# Patient Record
Sex: FEMALE | Race: WHITE | Employment: FULL TIME | URBAN - METROPOLITAN AREA
[De-identification: names, ages, dates, MRNs, and addresses within clinical notes are randomized per-mention and may not be internally consistent; named-entity substitution may affect disease eponyms.]

---

## 2017-02-10 ENCOUNTER — HOSPITAL ENCOUNTER (OUTPATIENT)
Age: 32
Discharge: HOME | End: 2017-02-10
Attending: FAMILY MEDICINE
Payer: COMMERCIAL

## 2017-02-10 VITALS
OXYGEN SATURATION: 100 % | BODY MASS INDEX: 37 KG/M2 | TEMPERATURE: 98 F | RESPIRATION RATE: 18 BRPM | HEART RATE: 81 BPM | WEIGHT: 207.25 LBS | SYSTOLIC BLOOD PRESSURE: 134 MMHG | DIASTOLIC BLOOD PRESSURE: 87 MMHG

## 2017-02-10 DIAGNOSIS — S23.9XXA THORACIC BACK SPRAIN, INITIAL ENCOUNTER: ICD-10-CM

## 2017-02-10 DIAGNOSIS — S46.819A TRAPEZIUS STRAIN, UNSPECIFIED LATERALITY, INITIAL ENCOUNTER: ICD-10-CM
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
## 2017-02-10 LAB — POCT URINE PREGNANCY: NEGATIVE

## 2017-02-10 PROCEDURE — 99214 OFFICE O/P EST MOD 30 MIN: CPT

## 2017-02-10 PROCEDURE — 99213 OFFICE O/P EST LOW 20 MIN: CPT

## 2017-02-10 PROCEDURE — 81025 URINE PREGNANCY TEST: CPT | Performed by: FAMILY MEDICINE

## 2017-02-10 RX ORDER — CYCLOBENZAPRINE HCL 10 MG
10 TABLET ORAL 3 TIMES DAILY
Qty: 9 TABLET | Refills: 0 | Status: SHIPPED | OUTPATIENT
Start: 2017-02-10 | End: 2017-02-13
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
## 2017-08-24 ENCOUNTER — NURSE TRIAGE (OUTPATIENT)
Dept: FAMILY MEDICINE CLINIC | Facility: CLINIC | Age: 32
End: 2017-08-24

## 2017-09-20 ENCOUNTER — TELEPHONE (OUTPATIENT)
Dept: FAMILY MEDICINE CLINIC | Facility: CLINIC | Age: 32
End: 2017-09-20

## 2017-09-20 NOTE — TELEPHONE ENCOUNTER
----- Message from Angie Echols sent at 9/20/2017 12:10 PM CDT -----      ----- Message -----  From: Gelacio Brasher  Sent: 9/12/2017   3:56 PM  To: Zain Echeverria MD    She did not, I will do so.     Thank you  ----- Message -----  From: Aaron Jett MD

## 2017-09-29 PROCEDURE — 87624 HPV HI-RISK TYP POOLED RSLT: CPT

## 2017-09-29 PROCEDURE — 88175 CYTOPATH C/V AUTO FLUID REDO: CPT

## 2017-09-30 ENCOUNTER — LAB ENCOUNTER (OUTPATIENT)
Dept: LAB | Age: 32
End: 2017-09-30
Attending: NURSE PRACTITIONER
Payer: COMMERCIAL

## 2017-09-30 DIAGNOSIS — Z01.419 PAP SMEAR, LOW-RISK: Primary | ICD-10-CM

## 2017-09-30 DIAGNOSIS — Z00.00 ROUTINE GENERAL MEDICAL EXAMINATION AT A HEALTH CARE FACILITY: ICD-10-CM

## 2018-07-13 ENCOUNTER — OFFICE VISIT (OUTPATIENT)
Dept: FAMILY MEDICINE CLINIC | Facility: CLINIC | Age: 33
End: 2018-07-13

## 2018-07-13 VITALS
BODY MASS INDEX: 30.3 KG/M2 | HEIGHT: 63 IN | SYSTOLIC BLOOD PRESSURE: 122 MMHG | DIASTOLIC BLOOD PRESSURE: 80 MMHG | HEART RATE: 72 BPM | OXYGEN SATURATION: 98 % | WEIGHT: 171 LBS | TEMPERATURE: 99 F | RESPIRATION RATE: 16 BRPM

## 2018-07-13 DIAGNOSIS — G89.29 CHRONIC LEFT-SIDED LOW BACK PAIN WITH LEFT-SIDED SCIATICA: ICD-10-CM

## 2018-07-13 DIAGNOSIS — F41.0 GENERALIZED ANXIETY DISORDER WITH PANIC ATTACKS: ICD-10-CM

## 2018-07-13 DIAGNOSIS — M50.90 CERVICAL DISC DISEASE: ICD-10-CM

## 2018-07-13 DIAGNOSIS — M54.2 CERVICALGIA: ICD-10-CM

## 2018-07-13 DIAGNOSIS — G56.22 ULNAR NERVE ENTRAPMENT AT ELBOW, LEFT: Primary | ICD-10-CM

## 2018-07-13 DIAGNOSIS — F41.1 GENERALIZED ANXIETY DISORDER WITH PANIC ATTACKS: ICD-10-CM

## 2018-07-13 DIAGNOSIS — M54.42 CHRONIC LEFT-SIDED LOW BACK PAIN WITH LEFT-SIDED SCIATICA: ICD-10-CM

## 2018-07-13 PROCEDURE — 99214 OFFICE O/P EST MOD 30 MIN: CPT | Performed by: FAMILY MEDICINE

## 2018-07-13 RX ORDER — CYCLOBENZAPRINE HCL 5 MG
5 TABLET ORAL NIGHTLY
Qty: 10 TABLET | Refills: 0 | Status: SHIPPED | OUTPATIENT
Start: 2018-07-13

## 2018-07-13 RX ORDER — PREDNISONE 20 MG/1
40 TABLET ORAL DAILY
Qty: 10 TABLET | Refills: 0 | Status: SHIPPED | OUTPATIENT
Start: 2018-07-13 | End: 2018-07-18

## 2018-07-13 NOTE — PATIENT INSTRUCTIONS
[address] [address]  [address] [facility], [facility], [address]  [phone]    Bob Cons

## 2018-07-15 NOTE — PROGRESS NOTES
Ty Mayen is a 29year old female. Patient presents with:  Back Pain: lower back pain,numbness in both arms, hands and feet      HPI:   1. Low back pain  3-4 days  Lower back, left side, radiates down left leg  No injuries, falls.   sharp stabbing 72   Temp 98.9 °F (37.2 °C) (Oral)   Resp 16   Ht 63\"   Wt 171 lb   SpO2 98%   BMI 30.29 kg/m²  Body mass index is 30.29 kg/m².       GENERAL: well developed, well nourished,in no apparent distress  SKIN: no rashes,no suspicious lesions  HEENT: atraumatic, any questions, complications, allergies, or worsening or changing symptoms. Patient is to call with any side effects or complications from the treatments as a result of today.

## 2018-12-11 PROBLEM — Z30.430 ENCOUNTER FOR IUD INSERTION: Status: ACTIVE | Noted: 2018-12-11

## 2022-06-03 PROCEDURE — 88175 CYTOPATH C/V AUTO FLUID REDO: CPT | Performed by: NURSE PRACTITIONER

## 2022-06-27 ENCOUNTER — HOSPITAL ENCOUNTER (OUTPATIENT)
Dept: ULTRASOUND IMAGING | Age: 37
Discharge: HOME | End: 2022-06-27
Attending: NURSE PRACTITIONER
Payer: COMMERCIAL

## 2022-06-27 ENCOUNTER — LAB ENCOUNTER (OUTPATIENT)
Dept: LAB | Age: 37
End: 2022-06-27
Attending: NURSE PRACTITIONER
Payer: COMMERCIAL

## 2022-06-27 ENCOUNTER — HOSPITAL ENCOUNTER (OUTPATIENT)
Dept: MAMMOGRAPHY | Age: 37
Discharge: HOME | End: 2022-06-27
Attending: NURSE PRACTITIONER
Payer: COMMERCIAL

## 2022-06-27 DIAGNOSIS — Z01.419 WOMEN'S ANNUAL ROUTINE GYNECOLOGICAL EXAMINATION: ICD-10-CM

## 2022-06-27 DIAGNOSIS — R92.8 FOLLOW-UP EXAMINATION OF ABNORMAL MAMMOGRAM: ICD-10-CM

## 2022-06-27 DIAGNOSIS — Z80.3 FAMILY HISTORY OF BREAST CANCER IN FIRST DEGREE RELATIVE: ICD-10-CM

## 2022-06-27 LAB
ALBUMIN SERPL-MCNC: 4 G/DL (ref 3.4–5)
ALBUMIN/GLOB SERPL: 1.3 {RATIO} (ref 1–2)
ALP LIVER SERPL-CCNC: 42 U/L
ALT SERPL-CCNC: 14 U/L
ANION GAP SERPL CALC-SCNC: 5 MMOL/L (ref 0–18)
AST SERPL-CCNC: 12 U/L (ref 15–37)
BASOPHILS # BLD AUTO: 0.02 X10(3) UL (ref 0–0.2)
BASOPHILS NFR BLD AUTO: 0.4 %
BILIRUB SERPL-MCNC: 0.3 MG/DL (ref 0.1–2)
BUN BLD-MCNC: 12 MG/DL (ref 7–18)
CALCIUM BLD-MCNC: 9 MG/DL (ref 8.5–10.1)
CHLORIDE SERPL-SCNC: 108 MMOL/L (ref 98–112)
CHOLEST SERPL-MCNC: 127 MG/DL (ref ?–200)
CO2 SERPL-SCNC: 25 MMOL/L (ref 21–32)
CREAT BLD-MCNC: 0.66 MG/DL
EOSINOPHIL # BLD AUTO: 0.07 X10(3) UL (ref 0–0.7)
EOSINOPHIL NFR BLD AUTO: 1.3 %
ERYTHROCYTE [DISTWIDTH] IN BLOOD BY AUTOMATED COUNT: 12 %
FASTING PATIENT LIPID ANSWER: YES
FASTING STATUS PATIENT QL REPORTED: YES
GLOBULIN PLAS-MCNC: 3.1 G/DL (ref 2.8–4.4)
GLUCOSE BLD-MCNC: 94 MG/DL (ref 70–99)
HCT VFR BLD AUTO: 40.5 %
HDLC SERPL-MCNC: 42 MG/DL (ref 40–59)
HGB BLD-MCNC: 13.2 G/DL
IMM GRANULOCYTES # BLD AUTO: 0.02 X10(3) UL (ref 0–1)
IMM GRANULOCYTES NFR BLD: 0.4 %
LDLC SERPL CALC-MCNC: 75 MG/DL (ref ?–100)
LYMPHOCYTES # BLD AUTO: 1.57 X10(3) UL (ref 1–4)
LYMPHOCYTES NFR BLD AUTO: 28.8 %
MCH RBC QN AUTO: 31.8 PG (ref 26–34)
MCHC RBC AUTO-ENTMCNC: 32.6 G/DL (ref 31–37)
MCV RBC AUTO: 97.6 FL
MONOCYTES # BLD AUTO: 0.55 X10(3) UL (ref 0.1–1)
MONOCYTES NFR BLD AUTO: 10.1 %
NEUTROPHILS # BLD AUTO: 3.23 X10 (3) UL (ref 1.5–7.7)
NEUTROPHILS # BLD AUTO: 3.23 X10(3) UL (ref 1.5–7.7)
NEUTROPHILS NFR BLD AUTO: 59 %
NONHDLC SERPL-MCNC: 85 MG/DL (ref ?–130)
OSMOLALITY SERPL CALC.SUM OF ELEC: 286 MOSM/KG (ref 275–295)
PLATELET # BLD AUTO: 261 10(3)UL (ref 150–450)
POTASSIUM SERPL-SCNC: 4.5 MMOL/L (ref 3.5–5.1)
PROT SERPL-MCNC: 7.1 G/DL (ref 6.4–8.2)
RBC # BLD AUTO: 4.15 X10(6)UL
SODIUM SERPL-SCNC: 138 MMOL/L (ref 136–145)
TRIGL SERPL-MCNC: 42 MG/DL (ref 30–149)
TSI SER-ACNC: 1.13 MIU/ML (ref 0.36–3.74)
VIT D+METAB SERPL-MCNC: 26 NG/ML (ref 30–100)
VLDLC SERPL CALC-MCNC: 6 MG/DL (ref 0–30)
WBC # BLD AUTO: 5.5 X10(3) UL (ref 4–11)

## 2022-06-27 PROCEDURE — 82306 VITAMIN D 25 HYDROXY: CPT

## 2022-06-27 PROCEDURE — 80061 LIPID PANEL: CPT

## 2022-06-27 PROCEDURE — 77062 BREAST TOMOSYNTHESIS BI: CPT | Performed by: NURSE PRACTITIONER

## 2022-06-27 PROCEDURE — 36415 COLL VENOUS BLD VENIPUNCTURE: CPT

## 2022-06-27 PROCEDURE — 76642 ULTRASOUND BREAST LIMITED: CPT | Performed by: NURSE PRACTITIONER

## 2022-06-27 PROCEDURE — 84443 ASSAY THYROID STIM HORMONE: CPT

## 2022-06-27 PROCEDURE — 85025 COMPLETE CBC W/AUTO DIFF WBC: CPT

## 2022-06-27 PROCEDURE — 77066 DX MAMMO INCL CAD BI: CPT | Performed by: NURSE PRACTITIONER

## 2022-06-27 PROCEDURE — 80053 COMPREHEN METABOLIC PANEL: CPT

## 2022-10-19 PROBLEM — Z98.890 STATUS POST HYSTEROSCOPY: Status: ACTIVE | Noted: 2022-10-19

## 2023-01-19 ENCOUNTER — OFFICE VISIT (OUTPATIENT)
Dept: FAMILY MEDICINE CLINIC | Facility: CLINIC | Age: 38
End: 2023-01-19
Payer: COMMERCIAL

## 2023-01-19 VITALS
RESPIRATION RATE: 16 BRPM | DIASTOLIC BLOOD PRESSURE: 70 MMHG | WEIGHT: 177 LBS | SYSTOLIC BLOOD PRESSURE: 114 MMHG | BODY MASS INDEX: 31.36 KG/M2 | HEART RATE: 75 BPM | HEIGHT: 63 IN | OXYGEN SATURATION: 100 % | TEMPERATURE: 98 F

## 2023-01-19 DIAGNOSIS — Z28.21 INFLUENZA VACCINATION DECLINED BY PATIENT: ICD-10-CM

## 2023-01-19 DIAGNOSIS — R59.0 POSTERIOR AURICULAR LYMPHADENOPATHY: Primary | ICD-10-CM

## 2023-01-19 PROCEDURE — 99203 OFFICE O/P NEW LOW 30 MIN: CPT | Performed by: FAMILY MEDICINE

## 2023-01-19 PROCEDURE — 3078F DIAST BP <80 MM HG: CPT | Performed by: FAMILY MEDICINE

## 2023-01-19 PROCEDURE — 3074F SYST BP LT 130 MM HG: CPT | Performed by: FAMILY MEDICINE

## 2023-01-19 PROCEDURE — 3008F BODY MASS INDEX DOCD: CPT | Performed by: FAMILY MEDICINE

## 2023-08-16 ENCOUNTER — OFFICE VISIT (OUTPATIENT)
Dept: FAMILY MEDICINE CLINIC | Facility: CLINIC | Age: 38
End: 2023-08-16
Payer: COMMERCIAL

## 2023-08-16 VITALS
BODY MASS INDEX: 28 KG/M2 | RESPIRATION RATE: 16 BRPM | OXYGEN SATURATION: 99 % | HEIGHT: 63 IN | DIASTOLIC BLOOD PRESSURE: 74 MMHG | SYSTOLIC BLOOD PRESSURE: 122 MMHG | WEIGHT: 158 LBS | HEART RATE: 80 BPM | TEMPERATURE: 99 F

## 2023-08-16 DIAGNOSIS — M54.2 NECK PAIN: Primary | ICD-10-CM

## 2023-08-16 DIAGNOSIS — M54.6 ACUTE RIGHT-SIDED THORACIC BACK PAIN: ICD-10-CM

## 2023-08-16 DIAGNOSIS — M54.50 LUMBAR BACK PAIN: ICD-10-CM

## 2023-08-16 DIAGNOSIS — R20.2 PARESTHESIA OF LEFT UPPER EXTREMITY: ICD-10-CM

## 2023-08-16 DIAGNOSIS — R09.89 THROAT CLEARING: ICD-10-CM

## 2023-08-16 DIAGNOSIS — Z00.00 LABORATORY EXAM ORDERED AS PART OF ROUTINE GENERAL MEDICAL EXAMINATION: ICD-10-CM

## 2023-08-16 DIAGNOSIS — E55.9 VITAMIN D INSUFFICIENCY: ICD-10-CM

## 2023-08-16 DIAGNOSIS — Z01.30 BLOOD PRESSURE CHECK: ICD-10-CM

## 2023-08-16 PROBLEM — Z30.430 ENCOUNTER FOR IUD INSERTION: Status: RESOLVED | Noted: 2018-12-11 | Resolved: 2023-08-16

## 2023-08-16 PROBLEM — Z98.890 STATUS POST HYSTEROSCOPY: Status: RESOLVED | Noted: 2022-10-19 | Resolved: 2023-08-16

## 2023-08-16 PROCEDURE — 3074F SYST BP LT 130 MM HG: CPT | Performed by: FAMILY MEDICINE

## 2023-08-16 PROCEDURE — 3078F DIAST BP <80 MM HG: CPT | Performed by: FAMILY MEDICINE

## 2023-08-16 PROCEDURE — 3008F BODY MASS INDEX DOCD: CPT | Performed by: FAMILY MEDICINE

## 2023-08-16 PROCEDURE — 99214 OFFICE O/P EST MOD 30 MIN: CPT | Performed by: FAMILY MEDICINE

## 2023-08-16 RX ORDER — CYCLOBENZAPRINE HCL 5 MG
5 TABLET ORAL NIGHTLY PRN
Qty: 30 TABLET | Refills: 1 | Status: SHIPPED | OUTPATIENT
Start: 2023-08-16

## 2023-08-16 RX ORDER — MELOXICAM 15 MG/1
15 TABLET ORAL DAILY PRN
Qty: 30 TABLET | Refills: 1 | Status: SHIPPED | OUTPATIENT
Start: 2023-08-16

## 2023-08-19 ENCOUNTER — LAB ENCOUNTER (OUTPATIENT)
Dept: LAB | Age: 38
End: 2023-08-19
Attending: FAMILY MEDICINE
Payer: COMMERCIAL

## 2023-08-19 DIAGNOSIS — M54.50 LUMBAR BACK PAIN: ICD-10-CM

## 2023-08-19 DIAGNOSIS — Z00.00 LABORATORY EXAM ORDERED AS PART OF ROUTINE GENERAL MEDICAL EXAMINATION: ICD-10-CM

## 2023-08-19 DIAGNOSIS — Z00.00 ROUTINE GENERAL MEDICAL EXAMINATION AT A HEALTH CARE FACILITY: Primary | ICD-10-CM

## 2023-08-19 DIAGNOSIS — M54.2 NECK PAIN: ICD-10-CM

## 2023-08-19 DIAGNOSIS — E55.9 VITAMIN D INSUFFICIENCY: ICD-10-CM

## 2023-08-19 DIAGNOSIS — M54.6 ACUTE RIGHT-SIDED THORACIC BACK PAIN: ICD-10-CM

## 2023-08-19 LAB
ALBUMIN SERPL-MCNC: 3.9 G/DL (ref 3.4–5)
ALBUMIN/GLOB SERPL: 1.2 {RATIO} (ref 1–2)
ALP LIVER SERPL-CCNC: 30 U/L
ALT SERPL-CCNC: 22 U/L
ANION GAP SERPL CALC-SCNC: 4 MMOL/L (ref 0–18)
AST SERPL-CCNC: 15 U/L (ref 15–37)
BASOPHILS # BLD AUTO: 0.02 X10(3) UL (ref 0–0.2)
BASOPHILS NFR BLD AUTO: 0.4 %
BILIRUB SERPL-MCNC: 0.4 MG/DL (ref 0.1–2)
BUN BLD-MCNC: 14 MG/DL (ref 7–18)
CALCIUM BLD-MCNC: 9.1 MG/DL (ref 8.5–10.1)
CHLORIDE SERPL-SCNC: 108 MMOL/L (ref 98–112)
CHOLEST SERPL-MCNC: 140 MG/DL (ref ?–200)
CO2 SERPL-SCNC: 25 MMOL/L (ref 21–32)
CREAT BLD-MCNC: 0.7 MG/DL
CRP SERPL-MCNC: <0.29 MG/DL (ref ?–0.3)
EGFRCR SERPLBLD CKD-EPI 2021: 114 ML/MIN/1.73M2 (ref 60–?)
EOSINOPHIL # BLD AUTO: 0.05 X10(3) UL (ref 0–0.7)
EOSINOPHIL NFR BLD AUTO: 0.9 %
ERYTHROCYTE [DISTWIDTH] IN BLOOD BY AUTOMATED COUNT: 12.3 %
ERYTHROCYTE [SEDIMENTATION RATE] IN BLOOD: 7 MM/HR
FASTING PATIENT LIPID ANSWER: YES
FASTING STATUS PATIENT QL REPORTED: YES
GLOBULIN PLAS-MCNC: 3.2 G/DL (ref 2.8–4.4)
GLUCOSE BLD-MCNC: 92 MG/DL (ref 70–99)
HCT VFR BLD AUTO: 39.7 %
HDLC SERPL-MCNC: 51 MG/DL (ref 40–59)
HGB BLD-MCNC: 13.2 G/DL
IMM GRANULOCYTES # BLD AUTO: 0.02 X10(3) UL (ref 0–1)
IMM GRANULOCYTES NFR BLD: 0.4 %
LDLC SERPL CALC-MCNC: 76 MG/DL (ref ?–100)
LYMPHOCYTES # BLD AUTO: 1.58 X10(3) UL (ref 1–4)
LYMPHOCYTES NFR BLD AUTO: 28.3 %
MCH RBC QN AUTO: 31.5 PG (ref 26–34)
MCHC RBC AUTO-ENTMCNC: 33.2 G/DL (ref 31–37)
MCV RBC AUTO: 94.7 FL
MONOCYTES # BLD AUTO: 0.55 X10(3) UL (ref 0.1–1)
MONOCYTES NFR BLD AUTO: 9.9 %
NEUTROPHILS # BLD AUTO: 3.36 X10 (3) UL (ref 1.5–7.7)
NEUTROPHILS # BLD AUTO: 3.36 X10(3) UL (ref 1.5–7.7)
NEUTROPHILS NFR BLD AUTO: 60.1 %
NONHDLC SERPL-MCNC: 89 MG/DL (ref ?–130)
OSMOLALITY SERPL CALC.SUM OF ELEC: 284 MOSM/KG (ref 275–295)
PLATELET # BLD AUTO: 233 10(3)UL (ref 150–450)
POTASSIUM SERPL-SCNC: 4.7 MMOL/L (ref 3.5–5.1)
PROT SERPL-MCNC: 7.1 G/DL (ref 6.4–8.2)
RBC # BLD AUTO: 4.19 X10(6)UL
RHEUMATOID FACT SERPL-ACNC: <10 IU/ML (ref ?–15)
SODIUM SERPL-SCNC: 137 MMOL/L (ref 136–145)
TRIGL SERPL-MCNC: 60 MG/DL (ref 30–149)
URATE SERPL-MCNC: 4.2 MG/DL
VIT D+METAB SERPL-MCNC: 36.8 NG/ML (ref 30–100)
VLDLC SERPL CALC-MCNC: 9 MG/DL (ref 0–30)
WBC # BLD AUTO: 5.6 X10(3) UL (ref 4–11)

## 2023-08-19 PROCEDURE — 80053 COMPREHEN METABOLIC PANEL: CPT | Performed by: FAMILY MEDICINE

## 2023-08-19 PROCEDURE — 86140 C-REACTIVE PROTEIN: CPT | Performed by: FAMILY MEDICINE

## 2023-08-19 PROCEDURE — 86225 DNA ANTIBODY NATIVE: CPT | Performed by: FAMILY MEDICINE

## 2023-08-19 PROCEDURE — 85652 RBC SED RATE AUTOMATED: CPT | Performed by: FAMILY MEDICINE

## 2023-08-19 PROCEDURE — 86431 RHEUMATOID FACTOR QUANT: CPT | Performed by: FAMILY MEDICINE

## 2023-08-19 PROCEDURE — 81374 HLA I TYPING 1 ANTIGEN LR: CPT | Performed by: FAMILY MEDICINE

## 2023-08-19 PROCEDURE — 86200 CCP ANTIBODY: CPT | Performed by: FAMILY MEDICINE

## 2023-08-19 PROCEDURE — 82306 VITAMIN D 25 HYDROXY: CPT | Performed by: FAMILY MEDICINE

## 2023-08-19 PROCEDURE — 84550 ASSAY OF BLOOD/URIC ACID: CPT | Performed by: FAMILY MEDICINE

## 2023-08-19 PROCEDURE — 80061 LIPID PANEL: CPT | Performed by: FAMILY MEDICINE

## 2023-08-19 PROCEDURE — 85025 COMPLETE CBC W/AUTO DIFF WBC: CPT | Performed by: FAMILY MEDICINE

## 2023-08-19 PROCEDURE — 86038 ANTINUCLEAR ANTIBODIES: CPT | Performed by: FAMILY MEDICINE

## 2023-08-21 LAB
CCP IGG SERPL-ACNC: 1.2 U/ML (ref 0–6.9)
DSDNA IGG SERPL IA-ACNC: 3.3 IU/ML
ENA AB SER QL IA: 0.3 UG/L
ENA AB SER QL IA: NEGATIVE

## 2023-08-22 ENCOUNTER — HOSPITAL ENCOUNTER (OUTPATIENT)
Dept: MAMMOGRAPHY | Age: 38
Discharge: HOME | End: 2023-08-22
Payer: COMMERCIAL

## 2023-08-22 ENCOUNTER — HOSPITAL ENCOUNTER (OUTPATIENT)
Dept: ULTRASOUND IMAGING | Age: 38
Discharge: HOME | End: 2023-08-22
Payer: COMMERCIAL

## 2023-08-22 DIAGNOSIS — R92.2 INCONCLUSIVE MAMMOGRAM: ICD-10-CM

## 2023-08-22 PROCEDURE — 77066 DX MAMMO INCL CAD BI: CPT

## 2023-08-22 PROCEDURE — 77062 BREAST TOMOSYNTHESIS BI: CPT

## 2023-08-22 PROCEDURE — 76642 ULTRASOUND BREAST LIMITED: CPT

## 2023-08-24 ENCOUNTER — TELEPHONE (OUTPATIENT)
Dept: PHYSICAL THERAPY | Facility: HOSPITAL | Age: 38
End: 2023-08-24

## 2023-08-24 NOTE — PROGRESS NOTES
Reviewed mammogram results with patient  Patient reports chronic bilateral breast pain since 2021, especially with physical manipulation, reports a family hx of breast cancer in her mother at the age of 48. Denies lumps, skin changes, skin rashes, nipple discharge.   Patient advised to follow up with Dr. Esperanza Hamilton for further evaluation of her bilateral breast pain and for further recommendation

## 2023-08-28 ENCOUNTER — TELEPHONE (OUTPATIENT)
Dept: PHYSICAL THERAPY | Facility: HOSPITAL | Age: 38
End: 2023-08-28

## 2023-08-28 LAB — HLA-B27: NEGATIVE

## 2023-08-30 ENCOUNTER — OFFICE VISIT (OUTPATIENT)
Dept: PHYSICAL THERAPY | Age: 38
End: 2023-08-30
Attending: FAMILY MEDICINE
Payer: COMMERCIAL

## 2023-08-30 DIAGNOSIS — M54.50 LUMBAR BACK PAIN: Primary | ICD-10-CM

## 2023-08-30 DIAGNOSIS — M54.6 ACUTE RIGHT-SIDED THORACIC BACK PAIN: ICD-10-CM

## 2023-08-30 DIAGNOSIS — R20.2 PARESTHESIA OF LEFT UPPER EXTREMITY: ICD-10-CM

## 2023-08-30 DIAGNOSIS — M54.2 NECK PAIN: ICD-10-CM

## 2023-08-30 PROCEDURE — 97110 THERAPEUTIC EXERCISES: CPT

## 2023-08-30 PROCEDURE — 97161 PT EVAL LOW COMPLEX 20 MIN: CPT

## 2023-09-01 ENCOUNTER — TELEPHONE (OUTPATIENT)
Dept: PHYSICAL THERAPY | Facility: HOSPITAL | Age: 38
End: 2023-09-01

## 2023-09-12 ENCOUNTER — OFFICE VISIT (OUTPATIENT)
Dept: PHYSICAL THERAPY | Age: 38
End: 2023-09-12
Attending: FAMILY MEDICINE
Payer: COMMERCIAL

## 2023-09-12 PROCEDURE — 97140 MANUAL THERAPY 1/> REGIONS: CPT

## 2023-09-12 PROCEDURE — 97110 THERAPEUTIC EXERCISES: CPT

## 2023-09-18 ENCOUNTER — APPOINTMENT (OUTPATIENT)
Dept: PHYSICAL THERAPY | Age: 38
End: 2023-09-18
Attending: FAMILY MEDICINE
Payer: COMMERCIAL

## 2023-09-18 ENCOUNTER — TELEPHONE (OUTPATIENT)
Dept: PHYSICAL THERAPY | Facility: HOSPITAL | Age: 38
End: 2023-09-18

## 2023-09-18 ENCOUNTER — OFFICE VISIT (OUTPATIENT)
Dept: PHYSICAL THERAPY | Age: 38
End: 2023-09-18
Attending: FAMILY MEDICINE
Payer: COMMERCIAL

## 2023-09-18 PROCEDURE — 97110 THERAPEUTIC EXERCISES: CPT

## 2023-09-18 PROCEDURE — 97140 MANUAL THERAPY 1/> REGIONS: CPT

## 2023-09-20 ENCOUNTER — APPOINTMENT (OUTPATIENT)
Dept: PHYSICAL THERAPY | Age: 38
End: 2023-09-20
Attending: FAMILY MEDICINE
Payer: COMMERCIAL

## 2023-09-26 ENCOUNTER — OFFICE VISIT (OUTPATIENT)
Dept: PHYSICAL THERAPY | Age: 38
End: 2023-09-26
Attending: FAMILY MEDICINE
Payer: COMMERCIAL

## 2023-09-26 PROCEDURE — 97140 MANUAL THERAPY 1/> REGIONS: CPT

## 2023-09-26 PROCEDURE — 97110 THERAPEUTIC EXERCISES: CPT

## 2023-09-26 NOTE — PROGRESS NOTES
Dx: Left sided cervical radiculopathy         Authorized # of Visits:  61         Next MD visit: none scheduled  Fall Risk: standard         Precautions: n/a         Subjective: States that her neck has been feeling better as of late. Indicates that she continues to have paraesthesia into the left forearm and hand. Objective: Treatment progressed per treatment log. Date:9/12/2023 TX#: 2/12 Date:9/18/2023  TX#: 3/   Date:9/26/2023  TX#: 4/ Date:               TX#: 5/ Date:               TX#: 6/ Date:               TX#: 7/ Date:               TX#: 8/   VAS 2/10 VAS 4/10 VAS 4/10 VAS /10 VAS /10 VAS /10 VAS /10   TherEx (25 Min) TherEx (25 Min) TherEx (25 Min)       UBE 6 min 3/3 UBE 6 min 3/3 UBE 6 min 3/3       Scapular \"w\" red x 20 Supine Rows and diagonals green x 20 Supine Rows and diagonals green x 20       Supine Rows and diagonals green x 20 Ulnar nerve glide at wrist and elbow x2 (HEP) Biceps Stretch 10 sec x 5       Single arm rows 36# from high  Wrist flexor stretch 10 sec x 5         Doorway stretch 10 sec x 5         TRX Lat stretch 10 sec x 5                Manual PT (15 Min) Manual PT (15 Min) Manual PT (15 Min)       OA posterior glide; facet up/down glide C2C3 to C4C5 OA posterior glide; facet up/down glide C2C3 to C4C5 OA posterior glide; facet up/down glide C2C3 to C4C5        Ulnar Nerve glide at wrist and elbow x 10 each Ulnar Nerve glide at wrist and elbow x 10 each         DTM to the medial epicondyle and tlexor tendon of the left UE. Assessment: The patient responded well to today's intervention. Increased UE mobility compared to last session. Goals (12 visits):    1. Increase NDI assessment > 11% from INE to DC. 2. Patient will be aware of postural limitations and be able to correct them independently. 3. Patient will have an increase in spinal mobility to >75% for all planes of motion in order to return to more normalized cervical ROM and function.     4. Patient will have an increase in core strength to assist with returning to sustained posturing and function as required for ADLs. 5. Patient will demonstrate an increase in MLT of the anterior thorax in order to return to more improved posture with ADLs. Plan: Assess response to today's treatment and progress as tolerated. Charges: TherEx 2 (25 min);  Manual PT 1 (15 min)      Total Timed Treatment: 40 min  Total Treatment Time: 40 min

## 2023-09-29 ENCOUNTER — OFFICE VISIT (OUTPATIENT)
Dept: PHYSICAL THERAPY | Age: 38
End: 2023-09-29
Attending: FAMILY MEDICINE
Payer: COMMERCIAL

## 2023-09-29 PROCEDURE — 97110 THERAPEUTIC EXERCISES: CPT

## 2023-09-29 PROCEDURE — 97140 MANUAL THERAPY 1/> REGIONS: CPT

## 2023-09-29 NOTE — PROGRESS NOTES
Dx: Left sided cervical radiculopathy         Authorized # of Visits:  61         Next MD visit: none scheduled  Fall Risk: standard         Precautions: n/a         Subjective: No neck or shoulder issues. States that the left medial elbow is the center of her soreness with paraesthesia into the 4th and 5th phalanc    Objective: Treatment progressed per treatment log. Date:9/12/2023 TX#: 2/12 Date:9/18/2023  TX#: 3/   Date:9/26/2023  TX#: 4/ Date:9/29/2023  TX#: 5/ Date:               TX#: 6/ Date:               TX#: 7/ Date:               TX#: 8/   VAS 2/10 VAS 4/10 VAS 4/10 VAS /410 VAS /10 VAS /10 VAS /10   TherEx (25 Min) TherEx (25 Min) TherEx (25 Min) TherEx (25 Min)      UBE 6 min 3/3 UBE 6 min 3/3 UBE 6 min 3/3 UBE 6 min 3/3      Scapular \"w\" red x 20 Supine Rows and diagonals green x 20 Supine Rows and diagonals green x 20 Manual Stretch into wrist extension 30 sec x 3      Supine Rows and diagonals green x 20 Ulnar nerve glide at wrist and elbow x2 (HEP) Biceps Stretch 10 sec x 5 Biceps stretch 10 sec x 5      Single arm rows 36# from high  Wrist flexor stretch 10 sec x 5         Doorway stretch 10 sec x 5         TRX Lat stretch 10 sec x 5                Manual PT (15 Min) Manual PT (15 Min) Manual PT (15 Min) Manual PT (15 Min)      OA posterior glide; facet up/down glide C2C3 to C4C5 OA posterior glide; facet up/down glide C2C3 to C4C5 OA posterior glide; facet up/down glide C2C3 to C4C5 DTM to common flexor tendon; graded manipulaiton to radiohumeral joint and intercarpal joints. Ulnar Nerve glide at wrist and elbow x 10 each Ulnar Nerve glide at wrist and elbow x 10 each         DTM to the medial epicondyle and tlexor tendon of the left UE. Assessment: The patient responded well to today's intervention. Increased wrist flexor mobility after today's treatment. Goals (12 visits):    1. Increase NDI assessment > 11% from INE to DC.   2. Patient will be aware of postural limitations and be able to correct them independently. 3. Patient will have an increase in spinal mobility to >75% for all planes of motion in order to return to more normalized cervical ROM and function. 4. Patient will have an increase in core strength to assist with returning to sustained posturing and function as required for ADLs. 5. Patient will demonstrate an increase in MLT of the anterior thorax in order to return to more improved posture with ADLs. Plan: Assess response to today's treatment and progress as tolerated. Charges: TherEx 2 (25 min);  Manual PT 1 (15 min)      Total Timed Treatment: 40 min  Total Treatment Time: 40 min

## 2023-10-02 ENCOUNTER — APPOINTMENT (OUTPATIENT)
Dept: PHYSICAL THERAPY | Age: 38
End: 2023-10-02
Attending: FAMILY MEDICINE
Payer: COMMERCIAL

## 2023-10-04 ENCOUNTER — OFFICE VISIT (OUTPATIENT)
Dept: PHYSICAL THERAPY | Age: 38
End: 2023-10-04
Attending: FAMILY MEDICINE
Payer: COMMERCIAL

## 2023-10-04 PROCEDURE — 97140 MANUAL THERAPY 1/> REGIONS: CPT

## 2023-10-04 PROCEDURE — 97110 THERAPEUTIC EXERCISES: CPT

## 2023-10-04 NOTE — PROGRESS NOTES
Dx: Left sided cervical radiculopathy         Authorized # of Visits:  61         Next MD visit: none scheduled  Fall Risk: standard         Precautions: n/a         Subjective: Neck and scapular pain has resolved. Paraesthesia into the 4th and 5th phalanx persists. Objective: Treatment progressed per treatment log. Date:9/12/2023 TX#: 2/12 Date:9/18/2023  TX#: 3/   Date:9/26/2023  TX#: 4/ Date:9/29/2023  TX#: 5/ Date:10/4/2023  TX#: 6/ Date:               TX#: 7/ Date:               TX#: 8/   VAS 2/10 VAS 4/10 VAS 4/10 VAS /410 VAS /10 VAS /10 VAS /10   TherEx (25 Min) TherEx (25 Min) TherEx (25 Min) TherEx (25 Min) TherEx (15 Min)     UBE 6 min 3/3 UBE 6 min 3/3 UBE 6 min 3/3 UBE 6 min 3/3 UBE 6 min 3/3     Scapular \"w\" red x 20 Supine Rows and diagonals green x 20 Supine Rows and diagonals green x 20 Manual Stretch into wrist extension 30 sec x 3 Manual Stretch into wrist extension 30 sec x 3     Supine Rows and diagonals green x 20 Ulnar nerve glide at wrist and elbow x2 (HEP) Biceps Stretch 10 sec x 5 Biceps stretch 10 sec x 5 Ulnar nerve glides     Single arm rows 36# from high  Wrist flexor stretch 10 sec x 5         Doorway stretch 10 sec x 5         TRX Lat stretch 10 sec x 5                Manual PT (15 Min) Manual PT (15 Min) Manual PT (15 Min) Manual PT (15 Min) Manual PT (25 Min)     OA posterior glide; facet up/down glide C2C3 to C4C5 OA posterior glide; facet up/down glide C2C3 to C4C5 OA posterior glide; facet up/down glide C2C3 to C4C5 DTM to common flexor tendon; graded manipulaiton to radiohumeral joint and intercarpal joints. OA posterior glide; facet up/down glide C2C3 to C4C5; Thoracic PA manipulation; Intercarpal mobilizations. Ulnar Nerve glide at wrist and elbow x 10 each Ulnar Nerve glide at wrist and elbow x 10 each         DTM to the medial epicondyle and tlexor tendon of the left UE. Assessment: Unable to alter the patient's symptoms with treatment.   Continues to present with tightness at the wrist flexor on the left side. Intercarpal mobility has increased overal.  Cervical motion is normalized. Goals (12 visits):    1. Increase NDI assessment > 11% from INE to DC. 2. Patient will be aware of postural limitations and be able to correct them independently. 3. Patient will have an increase in spinal mobility to >75% for all planes of motion in order to return to more normalized cervical ROM and function. 4. Patient will have an increase in core strength to assist with returning to sustained posturing and function as required for ADLs. 5. Patient will demonstrate an increase in MLT of the anterior thorax in order to return to more improved posture with ADLs. Plan: Assess response to today's treatment and progress as tolerated. Charges: TherEx 2 (25 min);  Manual PT 1 (15 min)      Total Timed Treatment: 40 min  Total Treatment Time: 40 min

## 2023-11-02 ENCOUNTER — OFFICE VISIT (OUTPATIENT)
Dept: FAMILY MEDICINE CLINIC | Facility: CLINIC | Age: 38
End: 2023-11-02
Payer: COMMERCIAL

## 2023-11-02 VITALS
RESPIRATION RATE: 16 BRPM | HEIGHT: 63 IN | SYSTOLIC BLOOD PRESSURE: 120 MMHG | HEART RATE: 72 BPM | OXYGEN SATURATION: 99 % | TEMPERATURE: 98 F | WEIGHT: 157.81 LBS | DIASTOLIC BLOOD PRESSURE: 70 MMHG | BODY MASS INDEX: 27.96 KG/M2

## 2023-11-02 DIAGNOSIS — M25.522 ELBOW PAIN, LEFT: ICD-10-CM

## 2023-11-02 DIAGNOSIS — R20.0 NUMBNESS OF FINGERS: Primary | ICD-10-CM

## 2023-11-02 DIAGNOSIS — Z28.21 INFLUENZA VACCINATION DECLINED BY PATIENT: ICD-10-CM

## 2023-11-02 PROCEDURE — 3078F DIAST BP <80 MM HG: CPT | Performed by: FAMILY MEDICINE

## 2023-11-02 PROCEDURE — 99213 OFFICE O/P EST LOW 20 MIN: CPT | Performed by: FAMILY MEDICINE

## 2023-11-02 PROCEDURE — 3008F BODY MASS INDEX DOCD: CPT | Performed by: FAMILY MEDICINE

## 2023-11-02 PROCEDURE — 3074F SYST BP LT 130 MM HG: CPT | Performed by: FAMILY MEDICINE

## 2023-11-26 ENCOUNTER — HOSPITAL ENCOUNTER (OUTPATIENT)
Dept: GENERAL RADIOLOGY | Age: 38
Discharge: HOME | End: 2023-11-26
Attending: FAMILY MEDICINE
Payer: COMMERCIAL

## 2023-11-26 ENCOUNTER — HOSPITAL ENCOUNTER (OUTPATIENT)
Dept: GENERAL RADIOLOGY | Age: 38
End: 2023-11-26
Attending: FAMILY MEDICINE
Payer: COMMERCIAL

## 2023-11-26 DIAGNOSIS — R20.0 NUMBNESS OF FINGERS: ICD-10-CM

## 2023-11-26 DIAGNOSIS — M25.522 ELBOW PAIN, LEFT: ICD-10-CM

## 2023-11-26 PROCEDURE — 73080 X-RAY EXAM OF ELBOW: CPT | Performed by: FAMILY MEDICINE

## 2023-11-26 PROCEDURE — 72050 X-RAY EXAM NECK SPINE 4/5VWS: CPT | Performed by: FAMILY MEDICINE

## 2024-07-02 ENCOUNTER — OFFICE VISIT (OUTPATIENT)
Dept: OBGYN CLINIC | Facility: CLINIC | Age: 39
End: 2024-07-02
Payer: COMMERCIAL

## 2024-07-02 VITALS
BODY MASS INDEX: 30 KG/M2 | HEART RATE: 66 BPM | SYSTOLIC BLOOD PRESSURE: 118 MMHG | DIASTOLIC BLOOD PRESSURE: 76 MMHG | WEIGHT: 167 LBS

## 2024-07-02 DIAGNOSIS — Z01.419 WELL WOMAN EXAM WITH ROUTINE GYNECOLOGICAL EXAM: Primary | ICD-10-CM

## 2024-07-02 DIAGNOSIS — Z80.3 FAMILY HISTORY OF BREAST CANCER: ICD-10-CM

## 2024-07-02 DIAGNOSIS — F41.9 ANXIETY: ICD-10-CM

## 2024-07-02 DIAGNOSIS — Z12.31 ENCOUNTER FOR SCREENING MAMMOGRAM FOR MALIGNANT NEOPLASM OF BREAST: ICD-10-CM

## 2024-07-02 DIAGNOSIS — Z97.5 IUD (INTRAUTERINE DEVICE) IN PLACE: ICD-10-CM

## 2024-07-02 PROCEDURE — 3074F SYST BP LT 130 MM HG: CPT | Performed by: NURSE PRACTITIONER

## 2024-07-02 PROCEDURE — 99385 PREV VISIT NEW AGE 18-39: CPT | Performed by: NURSE PRACTITIONER

## 2024-07-02 PROCEDURE — 3078F DIAST BP <80 MM HG: CPT | Performed by: NURSE PRACTITIONER

## 2024-07-02 RX ORDER — PAROXETINE HYDROCHLORIDE 20 MG/1
TABLET, FILM COATED ORAL
COMMUNITY
Start: 2019-06-01 | End: 2024-07-02

## 2024-07-02 RX ORDER — PAROXETINE HYDROCHLORIDE 20 MG/1
20 TABLET, FILM COATED ORAL DAILY
Qty: 90 TABLET | Refills: 0 | Status: SHIPPED | OUTPATIENT
Start: 2024-07-02

## 2024-07-02 NOTE — PROGRESS NOTES
Subjective:  Chief Complaint   Patient presents with    Annual     Iud, was not informed on new iud brand,  might want a new brand again, not a fan, refill on medication     Other     Hormones with iud      39 year old female  presents for annual.    No LMP recorded. (Menstrual status: IUD - Intrauterine Device).  Hx Prior Abnormal Pap: No  Pap Date: 22  Pap Result Notes: wnl  Menarche: 14 (2024  4:01 PM)  Period Cycle (Days): no periods on IUD (2024  4:01 PM)  Period Duration (Days): n.a (2024  4:01 PM)  Period Flow: n/a (2024  4:01 PM)  Use of Birth Control (if yes, specify type): Liletta IUD (2024  4:01 PM)  Hx Prior Abnormal Pap: No (2024  4:01 PM)  Pap Date: 22 (2024  4:01 PM)  Pap Result Notes: wnl (2024  4:01 PM)      Denies family history of ovarian and colon CA.  Mother, MGM and M aunt with history of breast CA    Feeling safe at home.    Most Recent Immunizations   Administered Date(s) Administered    Fluzone Vaccine Medicare () 2013    TDAP 2013   Deferred Date(s) Deferred    FLULAVAL 6 months & older 0.5 ml Prefilled syringe (57814) 2023    FLUZONE 6 months and older PFS 0.5 ml (85765) 2023      reports that she quit smoking about 11 years ago. Her smoking use included cigarettes. She has never used smokeless tobacco.   reports current alcohol use.    Past Medical History:    Anxiety    Generalized anxiety disorder with panic attacks    IUD (intrauterine device) in place    Dr. Jordan Mirza    Vitamin D insufficiency     Past Surgical History:   Procedure Laterality Date     delivery only  2014    Iud kyleena  10/30/2017    Dr. Ortega    Other surgical history Right 2022    cheilectomy       Review of Systems:  Pertinent items are noted in the HPI.    Objective:  /76   Pulse 66   Wt 167 lb (75.8 kg)   BMI 29.58 kg/m²    Physical Examination:  General appearance: Well dressed, well nourished in no  apparent distress  Neurologic/Psychiatric: Alert and oriented to person, place and time, mood normal, affect appropriate  Head: Normocephalic without obvious deformity, atraumatic  Neck: No thyromegaly, supple, non-tender, no masses, no adenopathy  Lungs: Clear to auscultation bilaterally, no rales, wheezes or rhonchi  Breasts: Symmetric, non-tender, no masses, lesions, retraction, dimpling or discharge bilaterally, no axillary or supraclavicular lymphadenopathy  Heart: Regular rate and rhythm, no gallops or murmurs  Abdomen: Soft, non-tender, non-distended, no masses, no hepatosplenomegaly, no hernias, no inguinal lymphadenopathy  Pelvic:    External genitalia- Normal, Bartholin's, urethra, skeins glands normal   Vagina- + IUD strings visualized, no vaginal lesions, physiologic discharge   Cervix- No lesions, long/closed, no cervical motion tenderness   Uterus- Normal sized, non-tender, no masses   Adnexa-  Non-tender, no masses  Extremities: Non-tender, full range of motion, no clubbing, cyanosis or edema  Skin:  General inspection- no rashes, lesions or discoloration    Assessment/Plan:  Normal well-woman exam.  Yearly mammogram ordered.  Pap smear UTD    Declined chaperone for exam today     Diagnoses and all orders for this visit:    Well woman exam with routine gynecological exam  - self breast exam discussed and encouraged  - ASCCP pap guidelines discussed, pt defers pap     Encounter for screening mammogram for malignant neoplasm of breast  -     [facility] CHRIS 2D+3D SCREENING BILAT (CPT=77067/59725); Future    Family history of breast cancer  -     [facility] CHRIS 2D+3D SCREENING BILAT (CPT=77067/19358); Future  - [facility] referral placed    IUD (intrauterine device) in place  - IUD strings visualized  - Prashantetta placed 10/2022  - remove on or before 10/2030     Anxiety  - paroxetine refilled per pt request  - no refills as pt needs to establish care with PCP  - pt made aware at OV    Return in about 1 year (around 7/2/2025)  for annual well woman exam or sooner if needed.

## 2024-09-03 ENCOUNTER — PATIENT MESSAGE (OUTPATIENT)
Dept: OBGYN CLINIC | Facility: CLINIC | Age: 39
End: 2024-09-03

## 2024-11-22 ENCOUNTER — OFFICE VISIT (OUTPATIENT)
Dept: FAMILY MEDICINE CLINIC | Facility: CLINIC | Age: 39
End: 2024-11-22
Payer: COMMERCIAL

## 2024-11-22 VITALS
OXYGEN SATURATION: 99 % | HEIGHT: 63 IN | WEIGHT: 168 LBS | RESPIRATION RATE: 16 BRPM | BODY MASS INDEX: 29.77 KG/M2 | HEART RATE: 70 BPM | TEMPERATURE: 98 F | SYSTOLIC BLOOD PRESSURE: 112 MMHG | DIASTOLIC BLOOD PRESSURE: 70 MMHG

## 2024-11-22 DIAGNOSIS — Z23 NEED FOR VACCINATION: ICD-10-CM

## 2024-11-22 DIAGNOSIS — Z28.21 INFLUENZA VACCINATION DECLINED BY PATIENT: ICD-10-CM

## 2024-11-22 DIAGNOSIS — E55.9 VITAMIN D INSUFFICIENCY: ICD-10-CM

## 2024-11-22 DIAGNOSIS — F41.9 ANXIETY: ICD-10-CM

## 2024-11-22 DIAGNOSIS — Z12.39 ENCOUNTER FOR BREAST CANCER SCREENING USING NON-MAMMOGRAM MODALITY: ICD-10-CM

## 2024-11-22 DIAGNOSIS — Z00.00 LABORATORY EXAM ORDERED AS PART OF ROUTINE GENERAL MEDICAL EXAMINATION: ICD-10-CM

## 2024-11-22 DIAGNOSIS — Z00.00 ROUTINE MEDICAL EXAM: Primary | ICD-10-CM

## 2024-11-22 RX ORDER — PAROXETINE 20 MG/1
20 TABLET, FILM COATED ORAL DAILY
Qty: 90 TABLET | Refills: 3 | Status: SHIPPED | OUTPATIENT
Start: 2024-11-22

## 2024-11-22 NOTE — PROGRESS NOTES
No chief complaint on file.      HPI:  Mary Ellen Kessler is a 39 year old female here today for preventative visit.      Imms- Discussed flu, covid, & tdap.      Cervical cancer screening- No h/o abnormal paps, HPV, or genital warts. Normal pap on 6/3/22, repeat 3 yrs. Has a Lyletta IUD and sees Mary Ellen Washington.      Breast cancer screening- + breast cancer in her mom at 51y/o & her Mgma at 59y/o. Has a mammogram in  scheduled b/c each generation it seemed it was a decade earlier.      Colon cancer screening- No early family h/o colon cancer.       Osteoporosis screening- no reason identified for early screening with dexa      Diet/exercise- working on this      Dental/Eye Check up-  Recommended pt see dentist once every 6 months for a cleaning and once every year for an eye exam.      Anxiety- Taking paroxetine for 5-6 yrs and feels it works well for it. Dr. Ortega was prescribing it and now transitioning to a new OB and they wanted me to erx. Was cutting paroxetine 20mg in half now and more emotional. Would like to go back to full 20mg tab.        Past Medical History:    Anxiety    Generalized anxiety disorder with panic attacks    IUD (intrauterine device) in place    Dr. Jordan Mirza    Vitamin D insufficiency     Past Surgical History:   Procedure Laterality Date      2013     delivery only  2014    Iud kyleena  10/30/2017    Dr. Ortega    Other surgical history Right 2022    cheilectomy     Medications Ordered Prior to Encounter[1]  Allergies[2]  Social History     Socioeconomic History    Marital status:      Spouse name: Not on file    Number of children: Not on file    Years of education: Not on file    Highest education level: Not on file   Occupational History    Not on file   Tobacco Use    Smoking status: Former     Current packs/day: 0.00     Types: Cigarettes     Quit date: 10/1/2012     Years since quittin.1    Smokeless tobacco: Never   Vaping Use     Vaping status: Never Used   Substance and Sexual Activity    Alcohol use: Yes     Alcohol/week: 2.0 standard drinks of alcohol     Types: 2 Shots of liquor per week     Comment: 2 drinks/weekends, never a problem    Drug use: Yes     Frequency: 4.0 times per week     Types: Cannabis     Comment: 5 mg gummy 2x/wk-relaxation,    Sexual activity: Yes     Partners: Male     Birth control/protection: I.U.D.     Comment: Lyletta   Other Topics Concern     Service Not Asked    Blood Transfusions Not Asked    Caffeine Concern No    Occupational Exposure Not Asked    Hobby Hazards Not Asked    Sleep Concern Not Asked    Stress Concern No    Weight Concern No    Special Diet No    Back Care Not Asked    Exercise Yes    Bike Helmet Not Asked    Seat Belt Yes    Self-Exams Not Asked   Social History Narrative    Not on file     Social Drivers of Health     Financial Resource Strain: Not on file   Food Insecurity: Not on file   Transportation Needs: Not on file   Physical Activity: Not on file   Stress: Not on file   Social Connections: Unknown (8/25/2024)    Received [facility]    Social Connections     Social Connections: Last Flowsheet Data: Not on file     Social Connections: Recent Result: Not on file   Housing Stability: Not on file     Family History   Problem Relation Age of Onset    Hypertension Mother     Psychiatric Mother         anxiety    Breast Cancer Mother 50    Cancer Mother         Breast    Psychiatric Father         bipolar, ADHD, anxiety    No Known Problems Sister     No Known Problems Brother     No Known Problems Brother     Breast Cancer Maternal Grandmother 60    Dementia Maternal Grandmother     Heart Disease Maternal Grandfather         CABG    Cancer Paternal Grandmother 40        Kidney    Cancer Paternal Grandfather 75        Pancreatic       Review of Systems - All systems reviewed and negative except for HPI    PHYSICAL EXAM:  /70   Pulse 70    Temp 98 °F (36.7 °C) (Temporal)   Resp 16   Ht 5' 3\" (1.6 m)   Wt 168 lb (76.2 kg)   LMP  (LMP Unknown)   SpO2 99%   Breastfeeding No   BMI 29.76 kg/m²   GENERAL APPEARANCE:  Alert, no acute distress, appears stated age  HEENT:  Head- Normocephalic, atraumatic.    Eyes- Extraocular movements intact, pupils equally round and reactive to light,  conjunctivae normal.    Ears- Tympanic membranes intact bilaterally.    Nose- Patent, normal septum and turbinates.    Mouth/Throat- Normal oral mucosa, throat non-erythematous.  NECK:  No submental, submandibular, ant/post cervical lymphadenopathy. No thyromegaly or masses.  PULMONARY:  Lungs clear to auscultation bilaterally. No wheezes, rales, or rhonchi. Normal respiratory effort.  CARDIOVASCULAR:  Regular rate and rhythm. No murmurs, gallops, or rubs.  ABDOMEN:  + bowel sounds, soft, nontender, nondistended. No hepatomegaly.  MUSCULOSKELETAL: Strength of upper and lower extremities 5/5 bilaterally. Normal gait.  NEUROLOGIC:  Cranial nerves 2-12 grossly intact.  PSYCHIATRIC:  Normal mood, affect, and hygiene.     ASSESSMENT/PLAN:    1. Routine medical exam    2. Encounter for breast cancer screening using non-mammogram modality  -had mamm ordered through gyne due to family hx.    3. Influenza vaccination declined by patient  - Fluzone trivalent vaccine, PF 0.5mL, 6mo+ (35485)    4. Laboratory exam ordered as part of routine general medical examination  - CBC W Differential W Platelet [E]; Future  - Comp Metabolic Panel (14) [E]; Future  - Lipid Panel [E]; Future    5. Need for vaccination  - Immunization Admin Counseling, 1st Component, 18 years and older  - Immunization Admin Counseling, Additional Component, 18 years and older  - Td (Tenivac/Decavac) (>7 Years)    6. Anxiety  - PARoxetine 20 MG Oral Tab; Take 1 tablet (20 mg total) by mouth daily.  Dispense: 90 tablet; Refill: 3    7. Vitamin D insufficiency  - Vitamin D [E]; Future        Patient verbalized  understanding and agrees to plan.      Return in about 1 year (around 11/22/2025) for annual physical, we will contact you with results.         [1]   No current outpatient medications on file prior to visit.     No current facility-administered medications on file prior to visit.   [2] No Known Allergies

## 2025-01-03 ENCOUNTER — HOSPITAL ENCOUNTER (OUTPATIENT)
Dept: MAMMOGRAPHY | Age: 40
Discharge: HOME | End: 2025-01-03
Attending: NURSE PRACTITIONER
Payer: COMMERCIAL

## 2025-01-03 DIAGNOSIS — Z12.31 ENCOUNTER FOR SCREENING MAMMOGRAM FOR MALIGNANT NEOPLASM OF BREAST: ICD-10-CM

## 2025-01-03 DIAGNOSIS — Z80.3 FAMILY HISTORY OF BREAST CANCER: ICD-10-CM

## 2025-01-03 PROCEDURE — 77067 SCR MAMMO BI INCL CAD: CPT | Performed by: NURSE PRACTITIONER

## 2025-01-03 PROCEDURE — 77063 BREAST TOMOSYNTHESIS BI: CPT | Performed by: NURSE PRACTITIONER

## 2025-01-08 ENCOUNTER — TELEPHONE (OUTPATIENT)
Dept: OBGYN CLINIC | Facility: CLINIC | Age: 40
End: 2025-01-08

## 2025-01-08 DIAGNOSIS — Z12.39 SPECIAL SCREENING EXAMINATION FOR NEOPLASM OF BREAST: Primary | ICD-10-CM

## 2025-01-08 DIAGNOSIS — R92.30 DENSE BREAST TISSUE ON MAMMOGRAM, UNSPECIFIED TYPE: ICD-10-CM

## 2025-01-08 NOTE — TELEPHONE ENCOUNTER
Mary Ellen Washington, APRN  1/4/2025 12:09 PM CST Back to Top      Mammogram read as no malignancy  Dense breast tissue noted for which I recommend bilateral whole breast US, if pt desires I can place order  Also risk of breast CA score elevated for which I had previously placed [facility] referral, pt to schedule appt

## 2025-01-08 NOTE — TELEPHONE ENCOUNTER
Spoke to patient.  Test results and provider recommendations reviewed.  [facility] contact information provided:   -Ana David [phone]  Patient desires additional testing.  Order for bilateral whole breast ultrasound pended for provider signature.  Patient instructed to look for notification in [employer] that ultrasound order has been placed and to contact Central Scheduling to schedule testing.  Patient aware additional testing should be completed within 6 months of mammogram.  Patient verbalized understanding.

## 2025-04-02 ENCOUNTER — LAB ENCOUNTER (OUTPATIENT)
Dept: LAB | Age: 40
End: 2025-04-02
Attending: FAMILY MEDICINE
Payer: COMMERCIAL

## 2025-04-02 DIAGNOSIS — Z00.00 LABORATORY EXAM ORDERED AS PART OF ROUTINE GENERAL MEDICAL EXAMINATION: ICD-10-CM

## 2025-04-02 DIAGNOSIS — E55.9 VITAMIN D INSUFFICIENCY: ICD-10-CM

## 2025-04-02 LAB
ALBUMIN SERPL-MCNC: 4.7 G/DL (ref 3.2–4.8)
ALBUMIN/GLOB SERPL: 1.8 {RATIO} (ref 1–2)
ALP LIVER SERPL-CCNC: 34 U/L
ALT SERPL-CCNC: 9 U/L
ANION GAP SERPL CALC-SCNC: 9 MMOL/L (ref 0–18)
AST SERPL-CCNC: 18 U/L (ref ?–34)
BASOPHILS # BLD AUTO: 0.04 X10(3) UL (ref 0–0.2)
BASOPHILS NFR BLD AUTO: 0.7 %
BILIRUB SERPL-MCNC: 0.6 MG/DL (ref 0.3–1.2)
BUN BLD-MCNC: 15 MG/DL (ref 9–23)
CALCIUM BLD-MCNC: 9.8 MG/DL (ref 8.7–10.6)
CHLORIDE SERPL-SCNC: 105 MMOL/L (ref 98–112)
CHOLEST SERPL-MCNC: 151 MG/DL (ref ?–200)
CO2 SERPL-SCNC: 27 MMOL/L (ref 21–32)
CREAT BLD-MCNC: 0.83 MG/DL
EGFRCR SERPLBLD CKD-EPI 2021: 92 ML/MIN/1.73M2 (ref 60–?)
EOSINOPHIL # BLD AUTO: 0.06 X10(3) UL (ref 0–0.7)
EOSINOPHIL NFR BLD AUTO: 1 %
ERYTHROCYTE [DISTWIDTH] IN BLOOD BY AUTOMATED COUNT: 12.2 %
FASTING PATIENT LIPID ANSWER: YES
FASTING STATUS PATIENT QL REPORTED: YES
GLOBULIN PLAS-MCNC: 2.6 G/DL (ref 2–3.5)
GLUCOSE BLD-MCNC: 98 MG/DL (ref 70–99)
HCT VFR BLD AUTO: 41 %
HDLC SERPL-MCNC: 55 MG/DL (ref 40–59)
HGB BLD-MCNC: 13.6 G/DL
IMM GRANULOCYTES # BLD AUTO: 0.02 X10(3) UL (ref 0–1)
IMM GRANULOCYTES NFR BLD: 0.3 %
LDLC SERPL CALC-MCNC: 84 MG/DL (ref ?–100)
LYMPHOCYTES # BLD AUTO: 1.17 X10(3) UL (ref 1–4)
LYMPHOCYTES NFR BLD AUTO: 19.2 %
MCH RBC QN AUTO: 31.1 PG (ref 26–34)
MCHC RBC AUTO-ENTMCNC: 33.2 G/DL (ref 31–37)
MCV RBC AUTO: 93.8 FL
MONOCYTES # BLD AUTO: 0.55 X10(3) UL (ref 0.1–1)
MONOCYTES NFR BLD AUTO: 9 %
NEUTROPHILS # BLD AUTO: 4.24 X10 (3) UL (ref 1.5–7.7)
NEUTROPHILS # BLD AUTO: 4.24 X10(3) UL (ref 1.5–7.7)
NEUTROPHILS NFR BLD AUTO: 69.8 %
NONHDLC SERPL-MCNC: 96 MG/DL (ref ?–130)
OSMOLALITY SERPL CALC.SUM OF ELEC: 293 MOSM/KG (ref 275–295)
PLATELET # BLD AUTO: 272 10(3)UL (ref 150–450)
POTASSIUM SERPL-SCNC: 4.6 MMOL/L (ref 3.5–5.1)
PROT SERPL-MCNC: 7.3 G/DL (ref 5.7–8.2)
RBC # BLD AUTO: 4.37 X10(6)UL
SODIUM SERPL-SCNC: 141 MMOL/L (ref 136–145)
TRIGL SERPL-MCNC: 61 MG/DL (ref 30–149)
VIT D+METAB SERPL-MCNC: 29.5 NG/ML (ref 30–100)
VLDLC SERPL CALC-MCNC: 10 MG/DL (ref 0–30)
WBC # BLD AUTO: 6.1 X10(3) UL (ref 4–11)

## 2025-04-02 PROCEDURE — 36415 COLL VENOUS BLD VENIPUNCTURE: CPT

## 2025-04-02 PROCEDURE — 80061 LIPID PANEL: CPT

## 2025-04-02 PROCEDURE — 82306 VITAMIN D 25 HYDROXY: CPT

## 2025-04-02 PROCEDURE — 85025 COMPLETE CBC W/AUTO DIFF WBC: CPT

## 2025-04-02 PROCEDURE — 80053 COMPREHEN METABOLIC PANEL: CPT

## 2025-06-03 ENCOUNTER — HOSPITAL ENCOUNTER (OUTPATIENT)
Dept: ULTRASOUND IMAGING | Age: 40
Discharge: HOME | End: 2025-06-03
Attending: NURSE PRACTITIONER
Payer: COMMERCIAL

## 2025-06-03 DIAGNOSIS — Z12.39 SPECIAL SCREENING EXAMINATION FOR NEOPLASM OF BREAST: ICD-10-CM

## 2025-06-03 DIAGNOSIS — R92.30 DENSE BREAST TISSUE ON MAMMOGRAM, UNSPECIFIED TYPE: ICD-10-CM

## 2025-06-03 PROCEDURE — 76641 ULTRASOUND BREAST COMPLETE: CPT | Performed by: NURSE PRACTITIONER

## 2025-06-03 NOTE — IMAGING NOTE
Mary Ellen Kessler is recommended for an ultrasound guided biopsy of the left breast by Dr.Priyesh Delong.    History Dense breast tissue on mammogram, unspecified type Special screening examination for neoplasm of breast   Findings-New heterogeneous lesion within the left breast 9:30 position.   Recommendation-ULTRASOUND-GUIDED BIOPSY: LEFT BREAST     See EMR for complete imaging report    Medications and allergies reviewed: NKDA reported  Current Medications[1]    Mary Ellen Kessler denies the use of prescribed anticoagulants, denies known bleeding disorders and/or liver disease, denies chemotherapy    Ultrasound guided breast biopsy procedure explained and questions answered.   Mary Ellen Kessler provided with written educational material.     Mary Ellen Kessler instructed to take 1000 mg of acetaminophen on the day of the biopsy, eat a light meal, and bring or wear a sport bra.  Post biopsy care and instruction reviewed: including no lifting more than five pounds, no upper body exercise, icing of biopsy site, no submerging in water.  Ms. Kessler verbalized understanding.    Mary Ellen Kessler informed that [facility] would be contacting her to schedule an appointment.          [1]   Current Outpatient Medications   Medication Sig Dispense Refill    PARoxetine 20 MG Oral Tab Take 1 tablet (20 mg total) by mouth daily. 90 tablet 3

## 2025-06-10 ENCOUNTER — TELEPHONE (OUTPATIENT)
Dept: GENERAL RADIOLOGY | Facility: HOSPITAL | Age: 40
End: 2025-06-10

## 2025-08-06 ENCOUNTER — HOSPITAL ENCOUNTER (OUTPATIENT)
Dept: MAMMOGRAPHY | Facility: HOSPITAL | Age: 40
Discharge: HOME | End: 2025-08-06
Attending: NURSE PRACTITIONER

## 2025-08-06 DIAGNOSIS — N63.0 BREAST NODULE: ICD-10-CM

## 2025-08-06 DIAGNOSIS — R92.8 ABNORMAL FINDING ON BREAST IMAGING: ICD-10-CM

## 2025-08-06 PROCEDURE — 19083 BX BREAST 1ST LESION US IMAG: CPT | Performed by: NURSE PRACTITIONER

## 2025-08-06 PROCEDURE — 77065 DX MAMMO INCL CAD UNI: CPT | Performed by: NURSE PRACTITIONER

## 2025-08-06 PROCEDURE — 88305 TISSUE EXAM BY PATHOLOGIST: CPT | Performed by: NURSE PRACTITIONER

## (undated) NOTE — LETTER
[facility] IN [place]  [address] [address]  Dept: [phone]  Dept Fax: [phone]         February 10, 2017    Patient: Lalitha Story   YOB: 1985   Date of Visit: 2/10/2017       To Whom It May Con

## (undated) NOTE — ED AVS SNAPSHOT
[facility] in [address] [place] [address] Drive,[address]    [address] [place] [address]    Phone:  [phone]    Fax:  [address]   MRN: [id number]    Department:  [facility] END   Date of Visit:  2/10/2017 Icing 10 minutes each time at least 3 times per day  Healthy lifting techniques reviewed with the patient   Alternate sit stand and walk   Encouraged to ambulate, avoid lifting anything over 5 lbs and avoid bending, stooping, kneeling at this time   Signs this physician (or your personal doctor if your instructions are to return to your personal doctor) about any new or lasting problems. The primary care or specialist physician will see patients referred from the [facility].  Follow-up care is at - If you are a smoker or have smoked in the last 12 months, we encourage you to explore options for quitting.     - If you have concerns related to behavioral health issues or thoughts of harming yourself, contact [facility] a